# Patient Record
Sex: MALE | NOT HISPANIC OR LATINO
[De-identification: names, ages, dates, MRNs, and addresses within clinical notes are randomized per-mention and may not be internally consistent; named-entity substitution may affect disease eponyms.]

---

## 2019-11-04 PROBLEM — Z00.00 ENCOUNTER FOR PREVENTIVE HEALTH EXAMINATION: Status: ACTIVE | Noted: 2019-11-04

## 2019-11-08 ENCOUNTER — APPOINTMENT (OUTPATIENT)
Dept: COLORECTAL SURGERY | Facility: CLINIC | Age: 39
End: 2019-11-08
Payer: COMMERCIAL

## 2019-11-08 VITALS
WEIGHT: 195 LBS | BODY MASS INDEX: 27.92 KG/M2 | HEIGHT: 70 IN | TEMPERATURE: 98.7 F | DIASTOLIC BLOOD PRESSURE: 86 MMHG | HEART RATE: 81 BPM | SYSTOLIC BLOOD PRESSURE: 128 MMHG

## 2019-11-08 DIAGNOSIS — K60.3 ANAL FISTULA: ICD-10-CM

## 2019-11-08 DIAGNOSIS — L29.0 PRURITUS ANI: ICD-10-CM

## 2019-11-08 PROCEDURE — 46600 DIAGNOSTIC ANOSCOPY SPX: CPT

## 2019-11-08 PROCEDURE — 99213 OFFICE O/P EST LOW 20 MIN: CPT | Mod: 25

## 2019-11-08 RX ORDER — HYDROCORTISONE 25 MG/G
2.5 CREAM TOPICAL TWICE DAILY
Qty: 30 | Refills: 0 | Status: ACTIVE | COMMUNITY
Start: 2019-11-08 | End: 1900-01-01

## 2019-11-08 RX ORDER — FINASTERIDE 5 MG/1
5 TABLET, FILM COATED ORAL
Refills: 0 | Status: ACTIVE | COMMUNITY

## 2019-11-08 NOTE — PHYSICAL EXAM
[None] : no anal fissures seen [Excoriation] : no perianal excoriation [Fistula] : no fistulas [Wart] : no warts [Ulcer ___ cm] : no ulcers [Nonprolapsing] : a nonprolapsing (grade I) [Normal] : was normal [Stool Sample Taken] : no stool obtained on rectal exam [JVD] : no jugular venous distention  [Gross Blood] : no gross blood [Thyroid] : the thyroid was abnormal [Carotid Bruits] : no carotid bruits [Normal Breath Sounds] : Normal breath sounds [Normal Heart Sounds] : normal heart sounds [2+] : left 2+ [No Rash or Lesion] : No rash or lesion [Petechiae] : no petechiae [Purpura] : no purpura  [Skin Induration] : no induration [Skin Ulcer] : no ulcer [Calm] : calm [de-identified] : hair, no visible rash. lots of hair [de-identified] : anoscopy done: no fissure, large hemorrhoid, or other.   [de-identified] : NAD

## 2019-11-08 NOTE — ASSESSMENT
[FreeTextEntry1] : Hirsuit. Pruritus. Recurrent. Occasional bleeding.\par Hydrocortisone cream worked last time.\par NO pain with BM.\par \par A/P  pruitus sheet\par lotrimin\par hydrocortisone\par Colonoscopy advised\par Avoid overcleaning.\par Water cleaning advised  Hair as an issue discussed.

## 2019-11-08 NOTE — HISTORY OF PRESENT ILLNESS
[FreeTextEntry1] : 38 year old male with history of anal fistula repair in 2015.  Seen in 2018 for pruritis ani and had relief with the hydrocortisone cream, now here for same. \par \par Bowel movements are daily, tend to be loose. Thinks it is related to caffeine / milk.  No blood in stools,  sometimes on paper.

## 2019-11-26 ENCOUNTER — TRANSCRIPTION ENCOUNTER (OUTPATIENT)
Age: 39
End: 2019-11-26

## 2019-11-26 ENCOUNTER — OTHER (OUTPATIENT)
Age: 39
End: 2019-11-26

## 2019-11-26 RX ORDER — POLYETHYLENE GLYCOL 3350, SODIUM CHLORIDE, SODIUM BICARBONATE AND POTASSIUM CHLORIDE WITH LEMON FLAVOR 420; 11.2; 5.72; 1.48 G/4L; G/4L; G/4L; G/4L
420 POWDER, FOR SOLUTION ORAL
Qty: 1 | Refills: 0 | Status: ACTIVE | COMMUNITY
Start: 2019-11-26 | End: 1900-01-01

## 2019-12-04 ENCOUNTER — APPOINTMENT (OUTPATIENT)
Dept: COLORECTAL SURGERY | Facility: AMBULATORY SURGERY CENTER | Age: 39
End: 2019-12-04
Payer: COMMERCIAL

## 2019-12-04 PROCEDURE — 45380 COLONOSCOPY AND BIOPSY: CPT

## 2019-12-10 ENCOUNTER — RESULT REVIEW (OUTPATIENT)
Age: 39
End: 2019-12-10